# Patient Record
Sex: FEMALE | Race: BLACK OR AFRICAN AMERICAN | NOT HISPANIC OR LATINO
[De-identification: names, ages, dates, MRNs, and addresses within clinical notes are randomized per-mention and may not be internally consistent; named-entity substitution may affect disease eponyms.]

---

## 2018-06-14 PROBLEM — Z00.00 ENCOUNTER FOR PREVENTIVE HEALTH EXAMINATION: Status: ACTIVE | Noted: 2018-06-14

## 2023-07-13 ENCOUNTER — APPOINTMENT (OUTPATIENT)
Dept: ORTHOPEDIC SURGERY | Facility: CLINIC | Age: 63
End: 2023-07-13
Payer: COMMERCIAL

## 2023-07-13 VITALS — WEIGHT: 192 LBS | BODY MASS INDEX: 28.44 KG/M2 | HEIGHT: 69 IN

## 2023-07-13 DIAGNOSIS — I10 ESSENTIAL (PRIMARY) HYPERTENSION: ICD-10-CM

## 2023-07-13 DIAGNOSIS — E78.00 PURE HYPERCHOLESTEROLEMIA, UNSPECIFIED: ICD-10-CM

## 2023-07-13 DIAGNOSIS — M75.101 UNSPECIFIED ROTATOR CUFF TEAR OR RUPTURE OF RIGHT SHOULDER, NOT SPECIFIED AS TRAUMATIC: ICD-10-CM

## 2023-07-13 PROCEDURE — 72040 X-RAY EXAM NECK SPINE 2-3 VW: CPT

## 2023-07-13 PROCEDURE — 73010 X-RAY EXAM OF SHOULDER BLADE: CPT | Mod: RT

## 2023-07-13 PROCEDURE — 73030 X-RAY EXAM OF SHOULDER: CPT | Mod: RT

## 2023-07-13 PROCEDURE — 99204 OFFICE O/P NEW MOD 45 MIN: CPT

## 2023-07-13 RX ORDER — DICLOFENAC SODIUM 75 MG/1
75 TABLET, DELAYED RELEASE ORAL
Qty: 30 | Refills: 0 | Status: ACTIVE | COMMUNITY
Start: 2023-07-13 | End: 1900-01-01

## 2023-07-13 NOTE — IMAGING
[Facet arthropathy] : Facet arthropathy [Disc space narrowing] : Disc space narrowing [Right] : right shoulder [Calcific density] : Calcific density [Type 2 acromion] : Type 2 acromion [de-identified] : \par ----------------------------------------------------------------------------\par \par Right shoulder exam: \par \par Inspection: no obvious deformity, no obvious masses, no swelling, no effusion, no atrophy\par ROM: \par    FF: 150 p, 40 a, difficulty initiating FF movement, pseudoparalysis \par    ER: 50\par    IR: s1\par Tenderness:\par    (-) Anterior/Biceps: \par    (-) Posterior\par    (-) Lateral\par    (-) Trapezius\par    (-) Scapula\par    (-) AC joint\par    (-) Crepitus with ROM\par    +Clicking\par Strength:  Pain with strength testing\par    FF: 3-/5\par    ER: 3-/5\par    IR: 5-/5\par    Biceps: 5-/5\par    Triceps: 5-/5\par    Distal: 5/5\par Neuro: In tact to light touch throughout\par Vascularity: Extremity warm and well perfused\par

## 2023-07-13 NOTE — DISCUSSION/SUMMARY
[de-identified] : Due to worsening pain and weakness with pseudoparalysis upon physical exam, patient will obtain right shoulder MRI to evaluate for possible recurrent rotator cuff tearing s/p previous repair approximately 8 years ago. \par \par Prescribed patient Diclofinac 75 mg to aid in pain management and inflammation - use as directed and take with food. \par \par ----------------------------------------------------------------------------\par \par All relevant imaging studies pertinent to today's visit, including x-rays, MRI's and/or other advanced imaging studies (CT/etc) were independently interpreted and reviewed with the patient as needed. Implications of the studies together with the patient's clinical picture were discussed to formulate a working diagnosis and management options were detailed.\par \par The patient was advised of the diagnosis.  The natural history of the pathology was explained in full. All questions were answered.  The risks and benefits of conservative and interventional treatment alternatives were explained to the patient\par  \par -------------------------------------------------------------------------\par \par The patient was advised that an advanced diagnostic/imaging study (MRI/CT/etc) will be ordered to evaluate potential pathology in the affected area. The patient was further advised to follow up in the office to review the results of the study and determine further management that may be indicated.\par \par -------------------------------------------------------------------------\par \par Patient warned of specific risks of medication related to bleeding, GI issues, increase blood pressure, and cardiac risks in addition to additional risks.  Patient advised to discuss with PMD  if any presence of stated issues.
spouse
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
## 2023-07-23 ENCOUNTER — FORM ENCOUNTER (OUTPATIENT)
Age: 63
End: 2023-07-23

## 2023-07-24 ENCOUNTER — APPOINTMENT (OUTPATIENT)
Dept: MRI IMAGING | Facility: CLINIC | Age: 63
End: 2023-07-24
Payer: COMMERCIAL

## 2023-07-24 PROCEDURE — 73221 MRI JOINT UPR EXTREM W/O DYE: CPT | Mod: RT

## 2023-08-04 ENCOUNTER — APPOINTMENT (OUTPATIENT)
Dept: ORTHOPEDIC SURGERY | Facility: CLINIC | Age: 63
End: 2023-08-04
Payer: COMMERCIAL

## 2023-08-04 VITALS — BODY MASS INDEX: 28.44 KG/M2 | HEIGHT: 69 IN | WEIGHT: 192 LBS

## 2023-08-04 DIAGNOSIS — S46.811A STRAIN OF OTHER MUSCLES, FASCIA AND TENDONS AT SHOULDER AND UPPER ARM LEVEL, RIGHT ARM, INITIAL ENCOUNTER: ICD-10-CM

## 2023-08-04 PROCEDURE — 99214 OFFICE O/P EST MOD 30 MIN: CPT

## 2023-08-04 NOTE — DATA REVIEWED
[MRI] : MRI [Right] : of the right [Shoulder] : shoulder [I independently reviewed and interpreted images and report] : I independently reviewed and interpreted images and report [FreeTextEntry1] : modeate grade partial supra tear, moderate partila subscap tear, biceps partial tearing tendinopathy, significant bursistis, and labral tearing

## 2023-08-04 NOTE — IMAGING
[Facet arthropathy] : Facet arthropathy [Disc space narrowing] : Disc space narrowing [Right] : right shoulder [Calcific density] : Calcific density [Type 2 acromion] : Type 2 acromion [de-identified] : \par  ----------------------------------------------------------------------------\par  \par  Right shoulder exam: \par  \par  Inspection: no obvious deformity, no obvious masses, no swelling, no effusion, no atrophy\par  ROM: \par     FF: 150 p, 40 a, difficulty initiating FF movement, pseudoparalysis \par     ER: 50\par     IR: s1\par  Tenderness:\par     (-) Anterior/Biceps: \par     (-) Posterior\par     (-) Lateral\par     (-) Trapezius\par     (-) Scapula\par     (-) AC joint\par     (-) Crepitus with ROM\par     +Clicking\par  Strength:  Pain with strength testing\par     FF: 3-/5\par     ER: 3-/5\par     IR: 5-/5\par     Biceps: 5-/5\par     Triceps: 5-/5\par     Distal: 5/5\par  Neuro: In tact to light touch throughout\par  Vascularity: Extremity warm and well perfused\par
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
## 2023-08-04 NOTE — DISCUSSION/SUMMARY
[de-identified] : Patient should start Physical therapy  Discussed possible future need for shoulder arthroscopy if symptoms dont resolve. Check with optimologist  due to glaucoma, before steroid injection   ------------------------------------------------------------------------ All relevant imaging studies pertinent to today's visit, including x-rays, MRI's and/or other advanced imaging studies (CT/etc) were independently interpreted and reviewed with the patient as needed. Implications of the studies together with the patient's clinical picture were discussed to formulate a working diagnosis and management options were detailed.  The patient was advised of the diagnosis.  The natural history of the pathology was explained in full. All questions were answered.  The risks and benefits of conservative and interventional treatment alternatives were explained to the patient  -------------------------------------------------------------------------  Patient warned of specific risks of medication related to bleeding, GI issues, increase blood pressure, and cardiac risks in addition to additional risks.  Patient advised to discuss with PMD  if any presence of stated issues.

## 2023-08-04 NOTE — REASON FOR VISIT
[FreeTextEntry2] : MRI: Right Shoulder  continued right shoulder pain at night and experienced some relief from Diclofenac.

## 2023-10-27 ENCOUNTER — APPOINTMENT (OUTPATIENT)
Dept: ORTHOPEDIC SURGERY | Facility: CLINIC | Age: 63
End: 2023-10-27
Payer: COMMERCIAL

## 2023-10-27 VITALS — WEIGHT: 192 LBS | BODY MASS INDEX: 28.44 KG/M2 | HEIGHT: 69 IN

## 2023-10-27 PROCEDURE — 99213 OFFICE O/P EST LOW 20 MIN: CPT

## 2023-12-08 ENCOUNTER — APPOINTMENT (OUTPATIENT)
Dept: ORTHOPEDIC SURGERY | Facility: CLINIC | Age: 63
End: 2023-12-08
Payer: COMMERCIAL

## 2023-12-08 VITALS — BODY MASS INDEX: 28.44 KG/M2 | WEIGHT: 192 LBS | HEIGHT: 69 IN

## 2023-12-08 PROCEDURE — 99214 OFFICE O/P EST MOD 30 MIN: CPT

## 2023-12-08 RX ORDER — AMLODIPINE BESYLATE 5 MG/1
TABLET ORAL
Refills: 0 | Status: ACTIVE | COMMUNITY

## 2024-02-09 ENCOUNTER — APPOINTMENT (OUTPATIENT)
Dept: ORTHOPEDIC SURGERY | Facility: CLINIC | Age: 64
End: 2024-02-09

## 2024-02-23 ENCOUNTER — APPOINTMENT (OUTPATIENT)
Dept: ORTHOPEDIC SURGERY | Facility: CLINIC | Age: 64
End: 2024-02-23
Payer: COMMERCIAL

## 2024-02-23 PROCEDURE — 99213 OFFICE O/P EST LOW 20 MIN: CPT

## 2024-02-23 NOTE — IMAGING
[Facet arthropathy] : Facet arthropathy [Disc space narrowing] : Disc space narrowing [Right] : right shoulder [Calcific density] : Calcific density [Type 2 acromion] : Type 2 acromion [de-identified] : ----------------------------------------------------------------------------  Right shoulder exam:   Inspection: no obvious deformity, no obvious masses, no swelling, no effusion, no atrophy ROM:     FF: 170 p,      ER: 50    IR: s1 Tenderness:    (+) Anterior/Biceps:     (-) Posterior    (-) Lateral    (-) Trapezius    (-) Scapula    (-) AC joint    (-) Crepitus with ROM    +Clicking Additional tests:    (+) Neer's    (+)Hawkin's    (neg) Nicholson's    (neg) Speed    (neg) Cross chest adduction Strength:  Pain with strength testing    FF: 5-/5    ER: 4+/5    IR: 5-/5    Biceps: 5-/5    Triceps: 5-/5    Distal: 5/5 Neuro: In tact to light touch throughout Vascularity: Extremity warm and well perfused
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
## 2024-02-23 NOTE — REASON FOR VISIT
[FreeTextEntry2] : Follow up: Right Shoulder  still has discomfort in RT shoulder but pain has diminished.  notes significant improvement in pain but still has some at night

## 2024-02-23 NOTE — DISCUSSION/SUMMARY
[de-identified] : Discussed inj vs arthroscopic shoulder repair  Has been doing well with PT will continue Discussed possible future need for shoulder arthroscopy if symptoms don't resolve. Discussed rehab and rehab of arthroscopic sx f/u 2-3 months ------------------------------------------------------------------------ All relevant imaging studies pertinent to today's visit, including x-rays, MRI's and/or other advanced imaging studies (CT/etc) were independently interpreted and reviewed with the patient as needed. Implications of the studies together with the patient's clinical picture were discussed to formulate a working diagnosis and management options were detailed.  The patient was advised of the diagnosis.  The natural history of the pathology was explained in full. All questions were answered.  The risks and benefits of conservative and interventional treatment alternatives were explained to the patient

## 2024-04-26 ENCOUNTER — APPOINTMENT (OUTPATIENT)
Dept: ORTHOPEDIC SURGERY | Facility: CLINIC | Age: 64
End: 2024-04-26
Payer: COMMERCIAL

## 2024-04-26 DIAGNOSIS — M67.921 UNSPECIFIED DISORDER OF SYNOVIUM AND TENDON, RIGHT UPPER ARM: ICD-10-CM

## 2024-04-26 DIAGNOSIS — M19.011 PRIMARY OSTEOARTHRITIS, RIGHT SHOULDER: ICD-10-CM

## 2024-04-26 DIAGNOSIS — M75.111 INCOMPLETE ROTATOR CUFF TEAR OR RUPTURE OF RIGHT SHOULDER, NOT SPECIFIED AS TRAUMATIC: ICD-10-CM

## 2024-04-26 PROCEDURE — 99212 OFFICE O/P EST SF 10 MIN: CPT

## 2024-04-26 NOTE — DISCUSSION/SUMMARY
[de-identified] : Pt feels like she has plateaued with therapy. Still having mild discomfort. Advised pt that injections or further interventions are still an option. Pt is satisfied with current management and defers treatment. ------------------------------------------------------------------------ All relevant imaging studies pertinent to today's visit, including x-rays, MRI's and/or other advanced imaging studies (CT/etc) were independently interpreted and reviewed with the patient as needed. Implications of the studies together with the patient's clinical picture were discussed to formulate a working diagnosis and management options were detailed.  The patient was advised of the diagnosis.  The natural history of the pathology was explained in full. All questions were answered.  The risks and benefits of conservative and interventional treatment alternatives were explained to the patient

## 2024-04-26 NOTE — REASON FOR VISIT
[FreeTextEntry2] : Follow up: Right Shoulder  stopped going to PT because she feels its not making improvement. but she is not in constant pain
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
## 2024-04-26 NOTE — IMAGING
[Facet arthropathy] : Facet arthropathy [Disc space narrowing] : Disc space narrowing [Right] : right shoulder [Calcific density] : Calcific density [Type 2 acromion] : Type 2 acromion [de-identified] : ----------------------------------------------------------------------------  Right shoulder exam:   Inspection: no obvious deformity, no obvious masses, no swelling, no effusion, no atrophy ROM:     FF: 170 p,      ER: 50    IR: s1 Tenderness:    (+) Anterior/Biceps:     (-) Posterior    (-) Lateral    (-) Trapezius    (-) Scapula    (-) AC joint    (-) Crepitus with ROM    +Clicking Additional tests:    (+) mild Neer's    (+) mildHawkin's    (neg) Nicholson's    (neg) Speed    (neg) Cross chest adduction Strength:  Pain with strength testing    FF: 5/5    ER: 5/5    IR: 5/5    Biceps: 5/5    Triceps: 5/5    Distal: 5/5 Neuro: In tact to light touch throughout Vascularity: Extremity warm and well perfused